# Patient Record
Sex: MALE | URBAN - METROPOLITAN AREA
[De-identification: names, ages, dates, MRNs, and addresses within clinical notes are randomized per-mention and may not be internally consistent; named-entity substitution may affect disease eponyms.]

---

## 2017-11-09 ENCOUNTER — IMPORTED ENCOUNTER (OUTPATIENT)
Dept: URBAN - METROPOLITAN AREA CLINIC 38 | Facility: CLINIC | Age: 67
End: 2017-11-09

## 2017-11-09 PROBLEM — H26.493 POSTERIOR CAPSULE OPACITY -     OU: Noted: 2017-11-09

## 2017-11-09 PROCEDURE — 92014 COMPRE OPH EXAM EST PT 1/>: CPT

## 2018-05-23 ENCOUNTER — IMPORTED ENCOUNTER (OUTPATIENT)
Dept: URBAN - METROPOLITAN AREA CLINIC 38 | Facility: CLINIC | Age: 68
End: 2018-05-23

## 2018-05-23 PROBLEM — H26.491 POSTERIOR CAPSULE OPACITY - OD: Noted: 2018-05-23

## 2018-05-23 PROCEDURE — 92012 INTRM OPH EXAM EST PATIENT: CPT

## 2018-06-13 ENCOUNTER — IMPORTED ENCOUNTER (OUTPATIENT)
Dept: URBAN - METROPOLITAN AREA CLINIC 38 | Facility: CLINIC | Age: 68
End: 2018-06-13

## 2018-06-13 PROCEDURE — 66821 AFTER CATARACT LASER SURGERY: CPT

## 2018-10-01 ENCOUNTER — IMPORTED ENCOUNTER (OUTPATIENT)
Dept: URBAN - METROPOLITAN AREA CLINIC 38 | Facility: CLINIC | Age: 68
End: 2018-10-01

## 2018-10-01 PROBLEM — H26.492 POSTERIOR CAPSULE OPACITY -   OS: Noted: 2018-10-01

## 2018-10-01 PROCEDURE — 92012 INTRM OPH EXAM EST PATIENT: CPT

## 2018-11-14 ENCOUNTER — IMPORTED ENCOUNTER (OUTPATIENT)
Dept: URBAN - METROPOLITAN AREA CLINIC 38 | Facility: CLINIC | Age: 68
End: 2018-11-14

## 2018-11-14 PROCEDURE — 66821 AFTER CATARACT LASER SURGERY: CPT

## 2018-12-22 ENCOUNTER — IMPORTED ENCOUNTER (OUTPATIENT)
Dept: URBAN - METROPOLITAN AREA CLINIC 38 | Facility: CLINIC | Age: 68
End: 2018-12-22

## 2018-12-22 PROBLEM — Z96.1 PSEUDOPHAKIA (PRESENCE OF INTRAOCULAR LENS): Noted: 2018-12-22

## 2018-12-22 PROCEDURE — 92012 INTRM OPH EXAM EST PATIENT: CPT

## 2019-05-23 ENCOUNTER — IMPORTED ENCOUNTER (OUTPATIENT)
Dept: URBAN - METROPOLITAN AREA CLINIC 38 | Facility: CLINIC | Age: 69
End: 2019-05-23

## 2022-06-13 ENCOUNTER — APPOINTMENT (OUTPATIENT)
Dept: URBAN - METROPOLITAN AREA CLINIC 193 | Age: 72
Setting detail: DERMATOLOGY
End: 2022-06-13

## 2022-06-13 PROBLEM — C44.91 BASAL CELL CARCINOMA OF SKIN, UNSPECIFIED: Status: ACTIVE | Noted: 2022-06-13

## 2022-06-13 PROCEDURE — OTHER MOHS SURGERY PHONE CONSULTATION: OTHER

## 2022-06-13 NOTE — PROCEDURE: MOHS SURGERY PHONE CONSULTATION
Has The Pathology Report Been Received?: Yes
Has The Patient Ever Received A Transplant?: No
Additional Information?: Pt is fully vaccinated for Covid. With one booster. Scheduled with pt's wife Miri.
If Yes- What Blood Thinners Do They Take?: Pt takes ASA. Pt cannot hold RX.
Patient Reported Location: Right Inferior Lateral Forehead
Date Of Mohs Surgery: 08/05/2022
Which Antibiotic Do They Take For Surgical Prophylaxis?: Amoxicillin (2 grams)
Detail Level: Simple
Time Of Mohs Surgery: 10 am

## 2022-06-18 ASSESSMENT — VISUAL ACUITY
OD_CC: 20/60-1
OS_CC: 20/25+1
OD_CC: J1
OD_CC: 20/30
OD_CC: J1
OS_CC: J1
OD_SC: 20/25-1
OD_BAT: 20/40
OS_CC: J1
OS_SC: 20/20-1
OD_CC: J1
OS_CC: J1
OD_CC: J4
OS_CC: 20/30-1
OD_BAT: <20/400
OS_CC: 20/40-1
OD_CC: 20/30-1
OS_BAT: 20/30
OS_CC: J2
OS_BAT: <20/400

## 2022-06-18 ASSESSMENT — TONOMETRY
OS_IOP_MMHG: 14
OD_IOP_MMHG: 11
OD_IOP_MMHG: 11
OS_IOP_MMHG: 12
OD_IOP_MMHG: 11
OS_IOP_MMHG: 14
OD_IOP_MMHG: 11
OS_IOP_MMHG: 11

## 2022-06-18 ASSESSMENT — KERATOMETRY
OD_K1POWER_DIOPTERS: 43.00
OD_K2POWER_DIOPTERS: 43.50
OS_AXISANGLE2_DEGREES: 109
OD_K1POWER_DIOPTERS: 42.25
OS_AXISANGLE2_DEGREES: 120
OS_K1POWER_DIOPTERS: 43.25
OD_AXISANGLE2_DEGREES: 162
OS_K2POWER_DIOPTERS: 43.50
OS_K1POWER_DIOPTERS: 43.00
OD_AXISANGLE2_DEGREES: 75
OD_AXISANGLE_DEGREES: 72
OD_AXISANGLE_DEGREES: 69
OD_K1POWER_DIOPTERS: 43.00
OS_AXISANGLE2_DEGREES: 15
OD_AXISANGLE2_DEGREES: 74
OS_AXISANGLE_DEGREES: 19
OS_K1POWER_DIOPTERS: 43.50
OS_K2POWER_DIOPTERS: 43.00
OD_K2POWER_DIOPTERS: 43.00
OD_AXISANGLE_DEGREES: 165
OD_AXISANGLE2_DEGREES: 159
OD_K1POWER_DIOPTERS: 42.75
OS_K2POWER_DIOPTERS: 43.50
OS_AXISANGLE_DEGREES: 105
OS_AXISANGLE_DEGREES: 108
OD_K2POWER_DIOPTERS: 42.00
OS_AXISANGLE2_DEGREES: 18
OS_K2POWER_DIOPTERS: 43.00
OS_AXISANGLE_DEGREES: 30
OD_AXISANGLE_DEGREES: 164
OD_K2POWER_DIOPTERS: 42.25
OS_K1POWER_DIOPTERS: 43.00

## 2022-08-05 ENCOUNTER — APPOINTMENT (OUTPATIENT)
Dept: URBAN - METROPOLITAN AREA CLINIC 193 | Age: 72
Setting detail: DERMATOLOGY
End: 2022-08-06

## 2022-08-05 PROBLEM — C44.319 BASAL CELL CARCINOMA OF SKIN OF OTHER PARTS OF FACE: Status: ACTIVE | Noted: 2022-08-05

## 2022-08-05 PROCEDURE — 14041 TIS TRNFR F/C/C/M/N/A/G/H/F: CPT

## 2022-08-05 PROCEDURE — 17311 MOHS 1 STAGE H/N/HF/G: CPT

## 2022-08-05 PROCEDURE — 17312 MOHS ADDL STAGE: CPT

## 2022-08-05 PROCEDURE — OTHER MOHS SURGERY: OTHER

## 2022-08-05 NOTE — PROCEDURE: MOHS SURGERY
Stage 4: Additional Anesthesia Type: 0.5% lidocaine with 1:200,000 epinephrine and a 1:10 solution of 8.4% sodium bicarbonate Wound Care: Aquaphor

## 2022-08-15 ENCOUNTER — APPOINTMENT (OUTPATIENT)
Dept: URBAN - METROPOLITAN AREA CLINIC 193 | Age: 72
Setting detail: DERMATOLOGY
End: 2022-08-16

## 2022-08-15 DIAGNOSIS — Z48.02 ENCOUNTER FOR REMOVAL OF SUTURES: ICD-10-CM

## 2022-08-15 PROCEDURE — 99024 POSTOP FOLLOW-UP VISIT: CPT

## 2022-08-15 PROCEDURE — OTHER SUTURE REMOVAL (GLOBAL PERIOD): OTHER

## 2022-08-15 ASSESSMENT — LOCATION ZONE DERM: LOCATION ZONE: FACE

## 2022-08-15 ASSESSMENT — LOCATION DETAILED DESCRIPTION DERM: LOCATION DETAILED: RIGHT INFERIOR LATERAL FOREHEAD

## 2022-08-15 ASSESSMENT — LOCATION SIMPLE DESCRIPTION DERM: LOCATION SIMPLE: RIGHT FOREHEAD

## 2022-08-15 NOTE — PROCEDURE: SUTURE REMOVAL (GLOBAL PERIOD)
Body Location Override (Optional - Billing Will Still Be Based On Selected Body Map Location If Applicable): Right inferior lateral forehead
Add 88521 Cpt? (Important Note: In 2017 The Use Of 15184 Is Being Tracked By Cms To Determine Future Global Period Reimbursement For Global Periods): yes
Detail Level: Detailed

## 2022-08-29 ENCOUNTER — APPOINTMENT (OUTPATIENT)
Dept: URBAN - METROPOLITAN AREA CLINIC 193 | Age: 72
Setting detail: DERMATOLOGY
End: 2022-08-29

## 2022-08-29 DIAGNOSIS — Z48.817 ENCOUNTER FOR SURGICAL AFTERCARE FOLLOWING SURGERY ON THE SKIN AND SUBCUTANEOUS TISSUE: ICD-10-CM

## 2022-08-29 PROCEDURE — OTHER POST-OP WOUND EVALUATION: OTHER

## 2022-08-29 ASSESSMENT — LOCATION DETAILED DESCRIPTION DERM: LOCATION DETAILED: RIGHT LATERAL FOREHEAD

## 2022-08-29 ASSESSMENT — LOCATION ZONE DERM: LOCATION ZONE: FACE

## 2022-08-29 ASSESSMENT — LOCATION SIMPLE DESCRIPTION DERM: LOCATION SIMPLE: RIGHT FOREHEAD

## 2022-08-29 NOTE — PROCEDURE: POST-OP WOUND EVALUATION
Wound Crusting?: crusted
Wound Diameter In Cm(Optional): 0
Body Location Override (Optional): right inferior lateral forehead
Detail Level: Detailed

## 2022-11-28 NOTE — PROCEDURE: MOHS SURGERY
Stat 9093 placed.    Mary Annmeta approved, patient scheduled tomorrow at 4:05 with MD appointment would need to be moved up to accommodate patient in infusion room.      PSR moved up MD appointment to 1:25 with zometa to follow. Contacted patient who is in agreement to times.    Consent 3/Introductory Paragraph: I gave the patient a chance to ask questions they had about the procedure.  Following this I explained the Mohs procedure and consent was obtained. The risks, benefits and alternatives to therapy were discussed in detail. Specifically, the risks of infection, scarring, bleeding, prolonged wound healing, incomplete removal, allergy to anesthesia, nerve injury and recurrence were addressed. Prior to the procedure, the treatment site was clearly identified and confirmed by the patient.

## 2023-03-10 NOTE — PROCEDURE: MOHS SURGERY
Echo EF nml  BNP negative  Hold BB, for possible bradycardia episode  Cont medical management     Unique Flap 2 Name: Free Cartilage graft

## 2024-10-12 NOTE — PROCEDURE: MOHS SURGERY
RISHI Probe Removed Zygomaticofacial Flap Text: Given the location of the defect, shape of the defect and the proximity to free margins a zygomaticofacial flap was deemed most appropriate for repair.  Using a sterile surgical marker, the appropriate flap was drawn incorporating the defect and placing the expected incisions within the relaxed skin tension lines where possible. The area thus outlined was incised deep to adipose tissue with a #15 scalpel blade with preservation of a vascular pedicle.  The skin margins were undermined to an appropriate distance in all directions utilizing iris scissors.  The flap was then placed into the defect and anchored with interrupted buried subcutaneous sutures. no